# Patient Record
Sex: FEMALE | Race: WHITE | NOT HISPANIC OR LATINO | URBAN - METROPOLITAN AREA
[De-identification: names, ages, dates, MRNs, and addresses within clinical notes are randomized per-mention and may not be internally consistent; named-entity substitution may affect disease eponyms.]

---

## 2020-09-03 ENCOUNTER — IMPORTED ENCOUNTER (OUTPATIENT)
Dept: URBAN - METROPOLITAN AREA CLINIC 50 | Facility: CLINIC | Age: 50
End: 2020-09-03

## 2021-04-17 ASSESSMENT — VISUAL ACUITY
OD_CC: J2
OS_PH: 20/20
OS_CC: J2
OD_SC: 20/30-
OS_SC: 20/25-
OD_PH: 20/20

## 2021-04-17 ASSESSMENT — TONOMETRY
OD_IOP_MMHG: 16
OS_IOP_MMHG: 14

## 2022-10-07 ENCOUNTER — COMPREHENSIVE EXAM (OUTPATIENT)
Dept: URBAN - METROPOLITAN AREA CLINIC 53 | Facility: CLINIC | Age: 52
End: 2022-10-07

## 2022-10-07 VITALS — DIASTOLIC BLOOD PRESSURE: 110 MMHG | HEIGHT: 55 IN | SYSTOLIC BLOOD PRESSURE: 127 MMHG | HEART RATE: 83 BPM

## 2022-10-07 DIAGNOSIS — H25.13: ICD-10-CM

## 2022-10-07 DIAGNOSIS — H04.123: ICD-10-CM

## 2022-10-07 DIAGNOSIS — H53.2: ICD-10-CM

## 2022-10-07 PROCEDURE — 92014 COMPRE OPH EXAM EST PT 1/>: CPT

## 2022-10-07 PROCEDURE — 92133 CPTRZD OPH DX IMG PST SGM ON: CPT

## 2022-10-07 ASSESSMENT — TONOMETRY
OD_IOP_MMHG: 21
OS_IOP_MMHG: 21

## 2022-10-07 ASSESSMENT — VISUAL ACUITY
OD_SC: 20/30
OD_PH: 20/20
OS_SC: 20/20

## 2022-10-07 NOTE — PATIENT DISCUSSION
Patient complains of intermittent binocular unsure if horizontal/vertical diplopia x 2-3 months. Patient also complains of pain OD. Patient states she believed right upper lid intermittently droops throughout the day. Denies GCA/CVA/Stroke symptoms. Denies muscle weakness or difficulty breathing, talking, swallowing. Denies history of hypertension, diabetes or high cholesterol. BP diastolic elevated, 288/328 in office today. EOMs full today (no restrictions). No APD. Cover test was extremely variable. VA normal. DFE and optic nerve WNL OU. Patient complained pain and tearing with suparduction OD and possible diplopia in inferior gaze but responses fluctuated. Recommend stat MRI of brain and orbits due to new diplopia and pain on eye movements OD. Recommend stat MG and GCA work up. Patient given scripts for labs and MRI. Recommend urgent referral to neurologist. Will send today's examination note to PCP. Discussed recommendation to go to ER but patient declined. Advised to go stat to ER if increased diplopia, visual changes, GCA/stroke signs/symptoms. Patient verbally expressed understanding. RTC if any visual changes. RTC in 1 months follow up.

## 2022-10-07 NOTE — PATIENT DISCUSSION
Letter sent to primary care doctor advising advising imaging and blood work to rule out compressive, vascular or systemic etiology.

## 2022-11-08 ENCOUNTER — FOLLOW UP (OUTPATIENT)
Dept: URBAN - METROPOLITAN AREA CLINIC 53 | Facility: CLINIC | Age: 52
End: 2022-11-08

## 2022-11-08 DIAGNOSIS — H53.2: ICD-10-CM

## 2022-11-08 DIAGNOSIS — M31.6: ICD-10-CM

## 2022-11-08 PROCEDURE — 92133 CPTRZD OPH DX IMG PST SGM ON: CPT

## 2022-11-08 PROCEDURE — 92012 INTRM OPH EXAM EST PATIENT: CPT

## 2022-11-08 ASSESSMENT — VISUAL ACUITY
OD_PH: 20/25+2
OS_SC: 20/30
OD_SC: 20/30
OS_PH: 20/25+2

## 2022-11-08 ASSESSMENT — TONOMETRY
OS_IOP_MMHG: 14
OD_IOP_MMHG: 14

## 2022-11-08 NOTE — PATIENT DISCUSSION
Patient complained  of intermittent binocular unsure if horizontal/vertical diplopia x 2-3 months. Patient also complained of pain OD. Patient stated she believed right upper lid intermittently droops throughout the day. Denies GCA/CVA/Stroke symptoms. Denies muscle weakness or difficulty breathing, talking, swallowing. Denies history of hypertension, diabetes or high cholesterol. BP diastolic elevated, 372/404 in office at previous visit. EOMs full today (no restrictions). No APD. Cover test was extremely variable. VA normal. DFE and optic nerve WNL OU. OCT RNFL repeated today and is stable. MRI of brain/orbits WNL (10/14/22). Lab work (10/11/22) ESR (39 H), CRP (10.3 H), CBC normal, MG labs normal. Patient was seen by Rheumatologist and was started on 60 mg of Prednisone. Patient had a temporal artery biopsy. Per patient results did not show active GCA but did show signs of previous inflammation (GCA). Patient was already started on the steroid before she had the biopsy. Patient states all diplopia has resolved since starting Prednisone and she is feeling significantly improved. Recommend continue care with Rheumatologist and PCP for further care and management. Discussed option of referral to neurologist and neuro-ophthalmologist. Latosha Guillermo patient information for [facility] and Dr. Chely Felix. Advised to go stat to ER if increased diplopia, visual changes, GCA/stroke signs/symptoms. Patient verbally expressed understanding. RTC if any visual changes.

## 2022-11-10 ENCOUNTER — DIAGNOSTICS ONLY (OUTPATIENT)
Dept: URBAN - METROPOLITAN AREA CLINIC 49 | Facility: CLINIC | Age: 52
End: 2022-11-10

## 2022-11-10 DIAGNOSIS — M31.6: ICD-10-CM

## 2022-11-10 PROCEDURE — 92083 EXTENDED VISUAL FIELD XM: CPT

## 2022-11-10 NOTE — PATIENT DISCUSSION
Patient complained  of intermittent binocular unsure if horizontal/vertical diplopia x 2-3 months. Patient also complained of pain OD. Patient stated she believed right upper lid intermittently droops throughout the day. Denies GCA/CVA/Stroke symptoms. Denies muscle weakness or difficulty breathing, talking, swallowing. Denies history of hypertension, diabetes or high cholesterol. BP diastolic elevated, 285/096 in office at previous visit. EOMs full today (no restrictions). No APD. Cover test was extremely variable. VA normal. DFE and optic nerve WNL OU. OCT RNFL repeated today and is stable. MRI of brain/orbits WNL (10/14/22). Lab work (10/11/22) ESR (39 H), CRP (10.3 H), CBC normal, MG labs normal. Patient was seen by Rheumatologist and was started on 60 mg of Prednisone. Patient had a temporal artery biopsy. Per patient results did not show active GCA but did show signs of previous inflammation (GCA). Patient was already started on the steroid before she had the biopsy. Patient states all diplopia has resolved since starting Prednisone and she is feeling significantly improved. Recommend continue care with Rheumatologist and PCP for further care and management. Discussed option of referral to neurologist and neuro-ophthalmologist. Magalis Cable patient information for [facility] and Dr. Steven Boles. Advised to go stat to ER if increased diplopia, visual changes, GCA/stroke signs/symptoms. Patient verbally expressed understanding. RTC if any visual changes. RTC in 1 months follow up.

## 2022-11-10 NOTE — PATIENT DISCUSSION
HVF (11/2022) WNL OU. RNFL not performed today due to being done on 11/8/22. HVF 24-2 Standard performed. Tasked to Dr. Christina Camarena to review and interpret.

## 2023-06-08 ENCOUNTER — ESTABLISHED PATIENT (OUTPATIENT)
Dept: URBAN - METROPOLITAN AREA CLINIC 53 | Facility: CLINIC | Age: 53
End: 2023-06-08

## 2023-06-08 DIAGNOSIS — M31.6: ICD-10-CM

## 2023-06-08 DIAGNOSIS — H25.13: ICD-10-CM

## 2023-06-08 PROCEDURE — 92014 COMPRE OPH EXAM EST PT 1/>: CPT

## 2023-06-08 PROCEDURE — 92133 CPTRZD OPH DX IMG PST SGM ON: CPT

## 2023-06-08 ASSESSMENT — VISUAL ACUITY
OD_PH: 20/25
OS_SC: 20/25-1
OD_SC: 20/30

## 2023-06-08 ASSESSMENT — TONOMETRY
OD_IOP_MMHG: 15
OS_IOP_MMHG: 15